# Patient Record
Sex: FEMALE | URBAN - METROPOLITAN AREA
[De-identification: names, ages, dates, MRNs, and addresses within clinical notes are randomized per-mention and may not be internally consistent; named-entity substitution may affect disease eponyms.]

---

## 2017-08-27 ENCOUNTER — HOSPITAL ENCOUNTER (OUTPATIENT)
Facility: MEDICAL CENTER | Age: 72
DRG: 247 | End: 2017-08-27
Payer: MEDICARE

## 2017-08-27 ENCOUNTER — HOSPITAL ENCOUNTER (INPATIENT)
Facility: MEDICAL CENTER | Age: 72
LOS: 2 days | DRG: 247 | End: 2017-08-29
Attending: HOSPITALIST | Admitting: HOSPITALIST
Payer: MEDICARE

## 2017-08-27 PROCEDURE — 770020 HCHG ROOM/CARE - TELE (206)

## 2017-08-27 RX ORDER — SODIUM CHLORIDE 9 MG/ML
INJECTION, SOLUTION INTRAVENOUS CONTINUOUS
Status: DISCONTINUED | OUTPATIENT
Start: 2017-08-28 | End: 2017-08-29 | Stop reason: HOSPADM

## 2017-08-27 RX ORDER — AMOXICILLIN 250 MG
2 CAPSULE ORAL 2 TIMES DAILY
Status: DISCONTINUED | OUTPATIENT
Start: 2017-08-28 | End: 2017-08-29 | Stop reason: HOSPADM

## 2017-08-27 RX ORDER — NAPROXEN 500 MG/1
250 TABLET ORAL 2 TIMES DAILY WITH MEALS
Status: DISCONTINUED | OUTPATIENT
Start: 2017-08-28 | End: 2017-08-28

## 2017-08-27 RX ORDER — AZELASTINE 1 MG/ML
1 SPRAY, METERED NASAL 2 TIMES DAILY
Status: DISCONTINUED | OUTPATIENT
Start: 2017-08-28 | End: 2017-08-28

## 2017-08-27 RX ORDER — ESCITALOPRAM OXALATE 10 MG/1
10 TABLET ORAL DAILY
COMMUNITY

## 2017-08-27 RX ORDER — ACETAMINOPHEN 325 MG/1
650 TABLET ORAL EVERY 6 HOURS PRN
Status: DISCONTINUED | OUTPATIENT
Start: 2017-08-27 | End: 2017-08-29 | Stop reason: HOSPADM

## 2017-08-27 RX ORDER — ESCITALOPRAM OXALATE 10 MG/1
10 TABLET ORAL DAILY
Status: DISCONTINUED | OUTPATIENT
Start: 2017-08-28 | End: 2017-08-29 | Stop reason: HOSPADM

## 2017-08-27 RX ORDER — ONDANSETRON 2 MG/ML
4 INJECTION INTRAMUSCULAR; INTRAVENOUS EVERY 4 HOURS PRN
Status: DISCONTINUED | OUTPATIENT
Start: 2017-08-27 | End: 2017-08-29 | Stop reason: HOSPADM

## 2017-08-27 RX ORDER — NAPROXEN 250 MG/1
250 TABLET ORAL 2 TIMES DAILY WITH MEALS
Status: ON HOLD | COMMUNITY
End: 2017-08-29

## 2017-08-27 RX ORDER — FLUTICASONE PROPIONATE 110 UG/1
2 AEROSOL, METERED RESPIRATORY (INHALATION)
Status: DISCONTINUED | OUTPATIENT
Start: 2017-08-28 | End: 2017-08-28

## 2017-08-27 RX ORDER — AZELASTINE 1 MG/ML
1 SPRAY, METERED NASAL 2 TIMES DAILY
COMMUNITY

## 2017-08-27 RX ORDER — THYROID 30 MG/1
30 TABLET ORAL DAILY
COMMUNITY

## 2017-08-27 RX ORDER — POLYETHYLENE GLYCOL 3350 17 G/17G
1 POWDER, FOR SOLUTION ORAL
Status: DISCONTINUED | OUTPATIENT
Start: 2017-08-27 | End: 2017-08-29 | Stop reason: HOSPADM

## 2017-08-27 RX ORDER — DICLOFENAC SODIUM 75 MG/1
75 TABLET, DELAYED RELEASE ORAL 2 TIMES DAILY
COMMUNITY

## 2017-08-27 RX ORDER — THYROID 30 MG/1
30 TABLET ORAL
Status: DISCONTINUED | OUTPATIENT
Start: 2017-08-28 | End: 2017-08-29 | Stop reason: HOSPADM

## 2017-08-27 RX ORDER — BISACODYL 10 MG
10 SUPPOSITORY, RECTAL RECTAL
Status: DISCONTINUED | OUTPATIENT
Start: 2017-08-27 | End: 2017-08-29 | Stop reason: HOSPADM

## 2017-08-27 RX ORDER — FLUTICASONE PROPIONATE 110 UG/1
2 AEROSOL, METERED RESPIRATORY (INHALATION) 2 TIMES DAILY
COMMUNITY

## 2017-08-27 RX ORDER — DICLOFENAC SODIUM 75 MG/1
75 TABLET, DELAYED RELEASE ORAL 2 TIMES DAILY PRN
Status: DISCONTINUED | OUTPATIENT
Start: 2017-08-27 | End: 2017-08-29 | Stop reason: HOSPADM

## 2017-08-27 RX ORDER — ONDANSETRON 4 MG/1
4 TABLET, ORALLY DISINTEGRATING ORAL EVERY 4 HOURS PRN
Status: DISCONTINUED | OUTPATIENT
Start: 2017-08-27 | End: 2017-08-29 | Stop reason: HOSPADM

## 2017-08-27 ASSESSMENT — COGNITIVE AND FUNCTIONAL STATUS - GENERAL
MOBILITY SCORE: 24
DAILY ACTIVITIY SCORE: 24
SUGGESTED CMS G CODE MODIFIER DAILY ACTIVITY: CH
SUGGESTED CMS G CODE MODIFIER MOBILITY: CH

## 2017-08-27 ASSESSMENT — PATIENT HEALTH QUESTIONNAIRE - PHQ9
SUM OF ALL RESPONSES TO PHQ QUESTIONS 1-9: 0
1. LITTLE INTEREST OR PLEASURE IN DOING THINGS: NOT AT ALL
2. FEELING DOWN, DEPRESSED, IRRITABLE, OR HOPELESS: NOT AT ALL
SUM OF ALL RESPONSES TO PHQ9 QUESTIONS 1 AND 2: 0

## 2017-08-27 ASSESSMENT — PAIN SCALES - GENERAL
PAINLEVEL_OUTOF10: 0
PAINLEVEL_OUTOF10: 0

## 2017-08-27 ASSESSMENT — LIFESTYLE VARIABLES: EVER_SMOKED: YES

## 2017-08-27 NOTE — PROGRESS NOTES
Direct admit from [facility]. Accepted by Dr. LUIS ANTONIO lloyd for unstable angina.  ADT signed & held, needs to be released upon pt arrival.  No written orders received.

## 2017-08-28 ENCOUNTER — RESOLUTE PROFESSIONAL BILLING HOSPITAL PROF FEE (OUTPATIENT)
Dept: HOSPITALIST | Facility: MEDICAL CENTER | Age: 72
End: 2017-08-28
Payer: MEDICARE

## 2017-08-28 PROBLEM — J45.909 ASTHMA: Status: ACTIVE | Noted: 2017-08-28

## 2017-08-28 PROBLEM — I21.4 NSTEMI (NON-ST ELEVATED MYOCARDIAL INFARCTION) (HCC): Status: ACTIVE | Noted: 2017-08-28

## 2017-08-28 LAB
ACT BLD: 191 SEC (ref 74–137)
ALBUMIN SERPL BCP-MCNC: 4 G/DL (ref 3.2–4.9)
ALBUMIN/GLOB SERPL: 1.3 G/DL
ALP SERPL-CCNC: 70 U/L (ref 30–99)
ALT SERPL-CCNC: 14 U/L (ref 2–50)
ANION GAP SERPL CALC-SCNC: 9 MMOL/L (ref 0–11.9)
AST SERPL-CCNC: 19 U/L (ref 12–45)
BASOPHILS # BLD AUTO: 0.5 % (ref 0–1.8)
BASOPHILS # BLD: 0.02 K/UL (ref 0–0.12)
BILIRUB SERPL-MCNC: 0.6 MG/DL (ref 0.1–1.5)
BUN SERPL-MCNC: 18 MG/DL (ref 8–22)
CALCIUM SERPL-MCNC: 9.6 MG/DL (ref 8.5–10.5)
CHLORIDE SERPL-SCNC: 106 MMOL/L (ref 96–112)
CO2 SERPL-SCNC: 23 MMOL/L (ref 20–33)
CREAT SERPL-MCNC: 0.53 MG/DL (ref 0.5–1.4)
EKG IMPRESSION: NORMAL
EKG IMPRESSION: NORMAL
EOSINOPHIL # BLD AUTO: 0.06 K/UL (ref 0–0.51)
EOSINOPHIL NFR BLD: 1.6 % (ref 0–6.9)
ERYTHROCYTE [DISTWIDTH] IN BLOOD BY AUTOMATED COUNT: 43 FL (ref 35.9–50)
GFR SERPL CREATININE-BSD FRML MDRD: >60 ML/MIN/1.73 M 2
GLOBULIN SER CALC-MCNC: 3.1 G/DL (ref 1.9–3.5)
GLUCOSE SERPL-MCNC: 93 MG/DL (ref 65–99)
HCT VFR BLD AUTO: 41.5 % (ref 37–47)
HGB BLD-MCNC: 13.9 G/DL (ref 12–16)
IMM GRANULOCYTES # BLD AUTO: 0.01 K/UL (ref 0–0.11)
IMM GRANULOCYTES NFR BLD AUTO: 0.3 % (ref 0–0.9)
INR PPP: 0.97 (ref 0.87–1.13)
LV EJECT FRACT  99904: 55
LV EJECT FRACT MOD 2C 99903: 56.48
LV EJECT FRACT MOD 4C 99902: 51.99
LV EJECT FRACT MOD BP 99901: 54.27
LYMPHOCYTES # BLD AUTO: 1.3 K/UL (ref 1–4.8)
LYMPHOCYTES NFR BLD: 34.8 % (ref 22–41)
MCH RBC QN AUTO: 30.3 PG (ref 27–33)
MCHC RBC AUTO-ENTMCNC: 33.5 G/DL (ref 33.6–35)
MCV RBC AUTO: 90.4 FL (ref 81.4–97.8)
MONOCYTES # BLD AUTO: 0.53 K/UL (ref 0–0.85)
MONOCYTES NFR BLD AUTO: 14.2 % (ref 0–13.4)
NEUTROPHILS # BLD AUTO: 1.82 K/UL (ref 2–7.15)
NEUTROPHILS NFR BLD: 48.6 % (ref 44–72)
NRBC # BLD AUTO: 0 K/UL
NRBC BLD AUTO-RTO: 0 /100 WBC
PLATELET # BLD AUTO: 256 K/UL (ref 164–446)
PMV BLD AUTO: 9 FL (ref 9–12.9)
POTASSIUM SERPL-SCNC: 3.7 MMOL/L (ref 3.6–5.5)
PROT SERPL-MCNC: 7.1 G/DL (ref 6–8.2)
PROTHROMBIN TIME: 13.2 SEC (ref 12–14.6)
RBC # BLD AUTO: 4.59 M/UL (ref 4.2–5.4)
SODIUM SERPL-SCNC: 138 MMOL/L (ref 135–145)
TROPONIN I SERPL-MCNC: 0.21 NG/ML (ref 0–0.04)
WBC # BLD AUTO: 3.7 K/UL (ref 4.8–10.8)

## 2017-08-28 PROCEDURE — A9270 NON-COVERED ITEM OR SERVICE: HCPCS

## 2017-08-28 PROCEDURE — 700111 HCHG RX REV CODE 636 W/ 250 OVERRIDE (IP)

## 2017-08-28 PROCEDURE — C1725 CATH, TRANSLUMIN NON-LASER: HCPCS

## 2017-08-28 PROCEDURE — C9600 PERC DRUG-EL COR STENT SING: HCPCS | Mod: LD

## 2017-08-28 PROCEDURE — C1769 GUIDE WIRE: HCPCS

## 2017-08-28 PROCEDURE — 4A023N7 MEASUREMENT OF CARDIAC SAMPLING AND PRESSURE, LEFT HEART, PERCUTANEOUS APPROACH: ICD-10-PCS | Performed by: INTERNAL MEDICINE

## 2017-08-28 PROCEDURE — 85347 COAGULATION TIME ACTIVATED: CPT

## 2017-08-28 PROCEDURE — C1887 CATHETER, GUIDING: HCPCS

## 2017-08-28 PROCEDURE — 94760 N-INVAS EAR/PLS OXIMETRY 1: CPT

## 2017-08-28 PROCEDURE — 700117 HCHG RX CONTRAST REV CODE 255: Performed by: INTERNAL MEDICINE

## 2017-08-28 PROCEDURE — 85610 PROTHROMBIN TIME: CPT

## 2017-08-28 PROCEDURE — 93306 TTE W/DOPPLER COMPLETE: CPT

## 2017-08-28 PROCEDURE — 84484 ASSAY OF TROPONIN QUANT: CPT

## 2017-08-28 PROCEDURE — 93458 L HRT ARTERY/VENTRICLE ANGIO: CPT

## 2017-08-28 PROCEDURE — A9270 NON-COVERED ITEM OR SERVICE: HCPCS | Performed by: INTERNAL MEDICINE

## 2017-08-28 PROCEDURE — 93005 ELECTROCARDIOGRAM TRACING: CPT | Performed by: INTERNAL MEDICINE

## 2017-08-28 PROCEDURE — 700102 HCHG RX REV CODE 250 W/ 637 OVERRIDE(OP): Performed by: INTERNAL MEDICINE

## 2017-08-28 PROCEDURE — C1874 STENT, COATED/COV W/DEL SYS: HCPCS

## 2017-08-28 PROCEDURE — 700102 HCHG RX REV CODE 250 W/ 637 OVERRIDE(OP)

## 2017-08-28 PROCEDURE — A9270 NON-COVERED ITEM OR SERVICE: HCPCS | Performed by: HOSPITALIST

## 2017-08-28 PROCEDURE — 99153 MOD SED SAME PHYS/QHP EA: CPT

## 2017-08-28 PROCEDURE — 36415 COLL VENOUS BLD VENIPUNCTURE: CPT

## 2017-08-28 PROCEDURE — B2151ZZ FLUOROSCOPY OF LEFT HEART USING LOW OSMOLAR CONTRAST: ICD-10-PCS | Performed by: INTERNAL MEDICINE

## 2017-08-28 PROCEDURE — C9606 PERC D-E COR REVASC W AMI S: HCPCS | Mod: LD

## 2017-08-28 PROCEDURE — B2111ZZ FLUOROSCOPY OF MULTIPLE CORONARY ARTERIES USING LOW OSMOLAR CONTRAST: ICD-10-PCS | Performed by: INTERNAL MEDICINE

## 2017-08-28 PROCEDURE — 700101 HCHG RX REV CODE 250

## 2017-08-28 PROCEDURE — 85025 COMPLETE CBC W/AUTO DIFF WBC: CPT

## 2017-08-28 PROCEDURE — 360979 HCHG DIAGNOSTIC CATH

## 2017-08-28 PROCEDURE — 700102 HCHG RX REV CODE 250 W/ 637 OVERRIDE(OP): Performed by: HOSPITALIST

## 2017-08-28 PROCEDURE — 80053 COMPREHEN METABOLIC PANEL: CPT

## 2017-08-28 PROCEDURE — C1894 INTRO/SHEATH, NON-LASER: HCPCS

## 2017-08-28 PROCEDURE — 027034Z DILATION OF CORONARY ARTERY, ONE ARTERY WITH DRUG-ELUTING INTRALUMINAL DEVICE, PERCUTANEOUS APPROACH: ICD-10-PCS | Performed by: INTERNAL MEDICINE

## 2017-08-28 PROCEDURE — 770020 HCHG ROOM/CARE - TELE (206)

## 2017-08-28 PROCEDURE — 700105 HCHG RX REV CODE 258: Performed by: HOSPITALIST

## 2017-08-28 PROCEDURE — 93010 ELECTROCARDIOGRAM REPORT: CPT | Mod: 76 | Performed by: INTERNAL MEDICINE

## 2017-08-28 PROCEDURE — 99152 MOD SED SAME PHYS/QHP 5/>YRS: CPT

## 2017-08-28 PROCEDURE — 304952 HCHG R 2 PADS

## 2017-08-28 PROCEDURE — 307093 HCHG TR BAND RADIAL

## 2017-08-28 PROCEDURE — 99222 1ST HOSP IP/OBS MODERATE 55: CPT | Mod: AI | Performed by: HOSPITALIST

## 2017-08-28 RX ORDER — SCOLOPAMINE TRANSDERMAL SYSTEM 1 MG/1
1 PATCH, EXTENDED RELEASE TRANSDERMAL
Status: DISCONTINUED | OUTPATIENT
Start: 2017-08-28 | End: 2017-08-29 | Stop reason: HOSPADM

## 2017-08-28 RX ORDER — DIPHENHYDRAMINE HYDROCHLORIDE 50 MG/ML
25 INJECTION INTRAMUSCULAR; INTRAVENOUS EVERY 6 HOURS PRN
Status: DISCONTINUED | OUTPATIENT
Start: 2017-08-28 | End: 2017-08-29 | Stop reason: HOSPADM

## 2017-08-28 RX ORDER — PRASUGREL 10 MG/1
60 TABLET, FILM COATED ORAL ONCE
Status: DISCONTINUED | OUTPATIENT
Start: 2017-08-28 | End: 2017-08-28

## 2017-08-28 RX ORDER — LORAZEPAM 1 MG/1
0.5 TABLET ORAL EVERY 4 HOURS PRN
Status: DISCONTINUED | OUTPATIENT
Start: 2017-08-28 | End: 2017-08-29 | Stop reason: HOSPADM

## 2017-08-28 RX ORDER — PRASUGREL 10 MG/1
10 TABLET, FILM COATED ORAL DAILY
Status: DISCONTINUED | OUTPATIENT
Start: 2017-08-29 | End: 2017-08-29 | Stop reason: HOSPADM

## 2017-08-28 RX ORDER — ALBUTEROL SULFATE 90 UG/1
2 AEROSOL, METERED RESPIRATORY (INHALATION)
Status: DISCONTINUED | OUTPATIENT
Start: 2017-08-28 | End: 2017-08-29 | Stop reason: HOSPADM

## 2017-08-28 RX ORDER — ONDANSETRON 2 MG/ML
4 INJECTION INTRAMUSCULAR; INTRAVENOUS EVERY 4 HOURS PRN
Status: DISCONTINUED | OUTPATIENT
Start: 2017-08-28 | End: 2017-08-29 | Stop reason: HOSPADM

## 2017-08-28 RX ORDER — ASPIRIN 325 MG
325 TABLET ORAL DAILY
Status: DISCONTINUED | OUTPATIENT
Start: 2017-08-28 | End: 2017-08-28

## 2017-08-28 RX ORDER — ZOLPIDEM TARTRATE 5 MG/1
5 TABLET ORAL
Status: DISCONTINUED | OUTPATIENT
Start: 2017-08-28 | End: 2017-08-29 | Stop reason: HOSPADM

## 2017-08-28 RX ORDER — MIDAZOLAM HYDROCHLORIDE 1 MG/ML
INJECTION INTRAMUSCULAR; INTRAVENOUS
Status: COMPLETED
Start: 2017-08-28 | End: 2017-08-28

## 2017-08-28 RX ORDER — ZOLPIDEM TARTRATE 5 MG/1
5 TABLET ORAL NIGHTLY PRN
Status: DISCONTINUED | OUTPATIENT
Start: 2017-08-28 | End: 2017-08-28

## 2017-08-28 RX ORDER — ROSUVASTATIN CALCIUM 20 MG/1
20 TABLET, COATED ORAL EVERY EVENING
Status: DISCONTINUED | OUTPATIENT
Start: 2017-08-28 | End: 2017-08-29 | Stop reason: HOSPADM

## 2017-08-28 RX ORDER — HALOPERIDOL 5 MG/ML
1 INJECTION INTRAMUSCULAR EVERY 6 HOURS PRN
Status: DISCONTINUED | OUTPATIENT
Start: 2017-08-28 | End: 2017-08-29 | Stop reason: HOSPADM

## 2017-08-28 RX ORDER — ATORVASTATIN CALCIUM 80 MG/1
80 TABLET, FILM COATED ORAL
Status: DISCONTINUED | OUTPATIENT
Start: 2017-08-28 | End: 2017-08-28

## 2017-08-28 RX ORDER — ONDANSETRON 2 MG/ML
INJECTION INTRAMUSCULAR; INTRAVENOUS
Status: COMPLETED
Start: 2017-08-28 | End: 2017-08-28

## 2017-08-28 RX ORDER — LIDOCAINE HYDROCHLORIDE 20 MG/ML
INJECTION, SOLUTION INFILTRATION; PERINEURAL
Status: COMPLETED
Start: 2017-08-28 | End: 2017-08-28

## 2017-08-28 RX ORDER — HEPARIN SODIUM,PORCINE 1000/ML
VIAL (ML) INJECTION
Status: COMPLETED
Start: 2017-08-28 | End: 2017-08-28

## 2017-08-28 RX ORDER — VERAPAMIL HYDROCHLORIDE 2.5 MG/ML
INJECTION, SOLUTION INTRAVENOUS
Status: COMPLETED
Start: 2017-08-28 | End: 2017-08-28

## 2017-08-28 RX ORDER — DEXAMETHASONE SODIUM PHOSPHATE 4 MG/ML
4 INJECTION, SOLUTION INTRA-ARTICULAR; INTRALESIONAL; INTRAMUSCULAR; INTRAVENOUS; SOFT TISSUE
Status: DISCONTINUED | OUTPATIENT
Start: 2017-08-28 | End: 2017-08-29 | Stop reason: HOSPADM

## 2017-08-28 RX ORDER — FLUTICASONE PROPIONATE 110 UG/1
2 AEROSOL, METERED RESPIRATORY (INHALATION) EVERY 12 HOURS
Status: DISCONTINUED | OUTPATIENT
Start: 2017-08-28 | End: 2017-08-29 | Stop reason: HOSPADM

## 2017-08-28 RX ORDER — ASPIRIN 81 MG/1
TABLET, CHEWABLE ORAL
Status: COMPLETED
Start: 2017-08-28 | End: 2017-08-28

## 2017-08-28 RX ORDER — PRASUGREL 10 MG/1
TABLET, FILM COATED ORAL
Status: COMPLETED
Start: 2017-08-28 | End: 2017-08-28

## 2017-08-28 RX ADMIN — ZOLPIDEM TARTRATE 5 MG: 5 TABLET, FILM COATED ORAL at 21:19

## 2017-08-28 RX ADMIN — FLUTICASONE PROPIONATE 220 MCG: 110 AEROSOL, METERED RESPIRATORY (INHALATION) at 21:27

## 2017-08-28 RX ADMIN — Medication 60 MG: at 08:49

## 2017-08-28 RX ADMIN — SODIUM CHLORIDE: 9 INJECTION, SOLUTION INTRAVENOUS at 01:19

## 2017-08-28 RX ADMIN — IOHEXOL 220 ML: 350 INJECTION, SOLUTION INTRAVENOUS at 09:19

## 2017-08-28 RX ADMIN — HEPARIN SODIUM 2000 UNITS: 200 INJECTION, SOLUTION INTRAVENOUS at 08:28

## 2017-08-28 RX ADMIN — LIDOCAINE HYDROCHLORIDE: 20 INJECTION, SOLUTION INFILTRATION; PERINEURAL at 08:27

## 2017-08-28 RX ADMIN — FENTANYL CITRATE 75 MCG: 50 INJECTION, SOLUTION INTRAMUSCULAR; INTRAVENOUS at 08:27

## 2017-08-28 RX ADMIN — HEPARIN SODIUM: 1000 INJECTION, SOLUTION INTRAVENOUS; SUBCUTANEOUS at 08:27

## 2017-08-28 RX ADMIN — MIDAZOLAM 2 MG: 1 INJECTION INTRAMUSCULAR; INTRAVENOUS at 08:26

## 2017-08-28 RX ADMIN — FLUTICASONE PROPIONATE 220 MCG: 110 AEROSOL, METERED RESPIRATORY (INHALATION) at 07:21

## 2017-08-28 RX ADMIN — ZOLPIDEM TARTRATE 5 MG: 5 TABLET, FILM COATED ORAL at 01:20

## 2017-08-28 RX ADMIN — ROSUVASTATIN CALCIUM 20 MG: 20 TABLET, FILM COATED ORAL at 21:18

## 2017-08-28 RX ADMIN — ASPIRIN 324 MG: 81 TABLET, CHEWABLE ORAL at 09:12

## 2017-08-28 RX ADMIN — ZOLPIDEM TARTRATE 5 MG: 5 TABLET, FILM COATED ORAL at 02:54

## 2017-08-28 RX ADMIN — METOPROLOL TARTRATE 25 MG: 25 TABLET, FILM COATED ORAL at 21:19

## 2017-08-28 RX ADMIN — METOPROLOL TARTRATE 25 MG: 25 TABLET, FILM COATED ORAL at 11:51

## 2017-08-28 RX ADMIN — LORAZEPAM 0.5 MG: 1 TABLET ORAL at 01:28

## 2017-08-28 RX ADMIN — VERAPAMIL HYDROCHLORIDE 2.5 MG: 2.5 INJECTION, SOLUTION INTRAVENOUS at 08:27

## 2017-08-28 RX ADMIN — ONDANSETRON 4 MG: 2 INJECTION INTRAMUSCULAR; INTRAVENOUS at 09:13

## 2017-08-28 RX ADMIN — LORAZEPAM 0.5 MG: 1 TABLET ORAL at 09:56

## 2017-08-28 RX ADMIN — NITROGLYCERIN 10 ML: 20 INJECTION INTRAVENOUS at 09:13

## 2017-08-28 RX ADMIN — MIDAZOLAM 0.5 MG: 1 INJECTION INTRAMUSCULAR; INTRAVENOUS at 09:13

## 2017-08-28 ASSESSMENT — ENCOUNTER SYMPTOMS
BLURRED VISION: 0
DIZZINESS: 0
WEIGHT LOSS: 0
DOUBLE VISION: 0
INSOMNIA: 0
COUGH: 0
MYALGIAS: 0
SEIZURES: 0
STRIDOR: 0
SHORTNESS OF BREATH: 0
ORTHOPNEA: 0
NERVOUS/ANXIOUS: 0
EYE DISCHARGE: 0
NAUSEA: 0
CHILLS: 0
BACK PAIN: 0
DEPRESSION: 0
PALPITATIONS: 0
EYE REDNESS: 0
EYE PAIN: 0
FOCAL WEAKNESS: 0
BRUISES/BLEEDS EASILY: 0
SPUTUM PRODUCTION: 0
VOMITING: 0
ABDOMINAL PAIN: 0
NECK PAIN: 0
TREMORS: 0
HEARTBURN: 0
HEADACHES: 0
FEVER: 0
DIARRHEA: 0

## 2017-08-28 ASSESSMENT — COPD QUESTIONNAIRES
DURING THE PAST 4 WEEKS HOW MUCH DID YOU FEEL SHORT OF BREATH: NONE/LITTLE OF THE TIME
DO YOU EVER COUGH UP ANY MUCUS OR PHLEGM?: NO/ONLY WITH OCCASIONAL COLDS OR INFECTIONS
HAVE YOU SMOKED AT LEAST 100 CIGARETTES IN YOUR ENTIRE LIFE: NO/DON'T KNOW
COPD SCREENING SCORE: 2

## 2017-08-28 ASSESSMENT — PAIN SCALES - GENERAL
PAINLEVEL_OUTOF10: 0

## 2017-08-28 ASSESSMENT — LIFESTYLE VARIABLES: EVER_SMOKED: YES

## 2017-08-28 NOTE — CATH LAB
Immediate Post-Operative Note      PreOp Diagnosis: non STEMI    PostOp Diagnosis: same    Procedure(s) :  Coronary Angiography, Left Heart Catheterization, Left Ventriculography.  2.75 x 12 Xiecne stent at ostium of ALEXIS. R radial approach    Surgeon(s):  Marcus Sawyer M.D.    Type of Anesthesia: Moderate Sedation    Specimen: None    Estimated Blood Loss: 10 cc's    Contrast Media:  220 cc's    Fluoro Time: 114.8 min    Xray DAP: [id number]    Findings: subtotal (98%) osteal ALEXIS with TAHIR II flow. Stented with good result. Mild hypokinsis of distal anterior wall.    Complications: none      Marcus Sawyer M.D.  8/28/2017 9:09 AM

## 2017-08-28 NOTE — PROGRESS NOTES
Air removed per protocol, and TR band removed. No signs of bleeding or hematoma. Right hand is warm and pulse strong, cap refill WDL. Pt denies numbness and tingling. Gauze and tegaderm applied to site. Pt reeducated on restrictions. Will monitor.

## 2017-08-28 NOTE — PROGRESS NOTES
Assumed care of patient at this time. Bedside report received from night RN. Pt and family inquiring about cath procedure today, pt told last night cath would be early this am. Cath lab called, no procedure ordered. Dr. Davis notified.

## 2017-08-28 NOTE — PROGRESS NOTES
Pt and family requesting to speak to cardiologist about discharge plans and home restrictions. Informed they will see cardiologist tomorrow during rounds and all questions can be addressed then. Family insisting they need to speak with them tonight.     Dr. Davis made aware of family and patient insisting to speak with him.

## 2017-08-28 NOTE — PROGRESS NOTES
Pt returned to floor from cath lab. Pt Ax4 and complaining of generalized discomfort and nausea. Pt given PRN ativan. Pt educated on restrictions for right wrist and POC. Pt v/u and will be continually reeducated. TR band to right wrist, no signs of bleeding or hematoma. Right hand is warm, cap refill WDL, pulse ox on with good wave form. Will continue to monitor closely.

## 2017-08-28 NOTE — PROGRESS NOTES
[facility] Hospitalist Progress Note    Date of Service: 2017    Chief Complaint  71 y.o. female admitted 2017 with ***    Interval Problem Update  ***    Consultants/Specialty  ***    Disposition  ***        ROS   Physical Exam  Laboratory/Imaging   Hemodynamics  Temp (24hrs), Av.7 °C (98 °F), Min:36.3 °C (97.3 °F), Max:37 °C (98.6 °F)   Temperature: 36.3 °C (97.3 °F)  Pulse  Av.5  Min: 90  Max: 107    Blood Pressure : 141/87      Respiratory      Respiration: 16, Pulse Oximetry: 93 %, O2 Daily Delivery Respiratory : Room Air with O2 Available     #MDI/DPI Given: MDI/DPI x 1, Work Of Breathing / Effort: Mild  RUL Breath Sounds: Clear, RML Breath Sounds: Clear, RLL Breath Sounds: Diminished, OCTAVIO Breath Sounds: Clear, LLL Breath Sounds: Diminished    Fluids    Intake/Output Summary (Last 24 hours) at 17 0801  Last data filed at 17 0600   Gross per 24 hour   Intake              490 ml   Output                0 ml   Net              490 ml       Nutrition  Orders Placed This Encounter   Procedures   • Diet NPO     Standing Status:   Standing     Number of Occurrences:   1     Order Specific Question:   Restrict to:     Answer:   Strict [1]     Physical Exam    Recent Labs      17   0121   WBC  3.7*   RBC  4.59   HEMOGLOBIN  13.9   HEMATOCRIT  41.5   MCV  90.4   MCH  30.3   MCHC  33.5*   RDW  43.0   PLATELETCT  256   MPV  9.0     Recent Labs      17   0121   SODIUM  138   POTASSIUM  3.7   CHLORIDE  106   CO2  23   GLUCOSE  93   BUN  18   CREATININE  0.53   CALCIUM  9.6     Recent Labs      17   0121   INR  0.97                  Assessment/Plan     Asthma- (present on admission)   Assessment & Plan    Well control, prn inhalers         NSTEMI (non-ST elevated myocardial infarction) (CMS-HCC)- (present on admission)   Assessment & Plan    Has mildly elevated troponin but moderate defect on NM stress test ,cardiologist consulted, going for cath in am. She is pain free at this  time.           Quality-Core Measures

## 2017-08-28 NOTE — PROGRESS NOTES
[facility] Hospitalist Progress Note    Date of Service: 2017    Chief Complaint  71 y.o. female with PMH of asthma admitted 2017 with NSTEMI    Interval Problem Update  Had cardiac cath and stent placed today. Feeling ok. Denies chest pain.    Consultants/Specialty  card    Disposition  Remain on the floor.        Review of Systems   Constitutional: Negative for chills, fever and weight loss.   HENT: Negative for congestion and nosebleeds.    Eyes: Negative for blurred vision, pain, discharge and redness.   Respiratory: Negative for cough, sputum production, shortness of breath and stridor.    Cardiovascular: Positive for chest pain. Negative for palpitations and orthopnea.   Gastrointestinal: Negative for abdominal pain, diarrhea, heartburn, nausea and vomiting.   Genitourinary: Negative for dysuria, frequency and urgency.   Musculoskeletal: Negative for back pain, myalgias and neck pain.   Skin: Negative for itching and rash.   Neurological: Negative for dizziness, focal weakness, seizures and headaches.   Psychiatric/Behavioral: Negative for depression. The patient is not nervous/anxious and does not have insomnia.       Physical Exam  Laboratory/Imaging   Hemodynamics  Temp (24hrs), Av.4 °C (97.5 °F), Min:36 °C (96.8 °F), Max:37 °C (98.6 °F)   Temperature: 36.1 °C (97 °F)  Pulse  Av.3  Min: 84  Max: 107    Blood Pressure : 149/95      Respiratory      Respiration: 18, Pulse Oximetry: 94 %, O2 Daily Delivery Respiratory : Room Air with O2 Available     #MDI/DPI Given: MDI/DPI x 1, Work Of Breathing / Effort: Mild  RUL Breath Sounds: Clear, RML Breath Sounds: Clear, RLL Breath Sounds: Clear, OCTAVIO Breath Sounds: Clear, LLL Breath Sounds: Clear    Fluids    Intake/Output Summary (Last 24 hours) at 17 1240  Last data filed at 17 0900   Gross per 24 hour   Intake              490 ml   Output                0 ml   Net              490 ml       Nutrition  Orders Placed This Encounter    Procedures   • Diet Order     Standing Status:   Standing     Number of Occurrences:   1     Order Specific Question:   Diet:     Answer:   Cardiac [6]     Physical Exam   Constitutional: She is oriented to person, place, and time. No distress.   HENT:   Head: Normocephalic and atraumatic.   Mouth/Throat: Oropharynx is clear and moist.   Eyes: Conjunctivae and EOM are normal. Pupils are equal, round, and reactive to light.   Neck: Normal range of motion. Neck supple. No tracheal deviation present. No thyromegaly present.   Cardiovascular: Normal rate and regular rhythm.    No murmur heard.  Pulmonary/Chest: Effort normal and breath sounds normal. No respiratory distress. She has no wheezes.   Abdominal: Soft. Bowel sounds are normal. She exhibits no distension. There is no tenderness.   Musculoskeletal: She exhibits no edema or tenderness.   Neurological: She is alert and oriented to person, place, and time. No cranial nerve deficit.   Skin: Skin is warm and dry. She is not diaphoretic. No erythema.   Psychiatric: She has a normal mood and affect. Her behavior is normal. Thought content normal.       Recent Labs      08/28/17   0121   WBC  3.7*   RBC  4.59   HEMOGLOBIN  13.9   HEMATOCRIT  41.5   MCV  90.4   MCH  30.3   MCHC  33.5*   RDW  43.0   PLATELETCT  256   MPV  9.0     Recent Labs      08/28/17   0121   SODIUM  138   POTASSIUM  3.7   CHLORIDE  106   CO2  23   GLUCOSE  93   BUN  18   CREATININE  0.53   CALCIUM  9.6     Recent Labs      08/28/17   0121   INR  0.97                  Assessment/Plan     NSTEMI (non-ST elevated myocardial infarction) (CMS-[facility])- (present on admission)   Assessment & Plan    Cardiac cath today: showed subtotal (98%) osteal ALEXIS with TAHIR II flow post Stent with good result. Mild hypokinsis of distal anterior wall.  On asa, effient, statin, bbocker  Card: following        Asthma- (present on admission)   Assessment & Plan    Well control, prn inhalers             Reviewed items::   Labs reviewed, Medications reviewed and Radiology images reviewed  DVT: on asa and effient.

## 2017-08-28 NOTE — CONSULTS
Cardiology Consult Note    DOS: 8/28/2017    Consulting physician: Rashad Yadav    Chief complaint/Reason for consult: NSTEMI    HPI:  Patient is a 70 yo WF with no medical history, from the [place] area, who was in her usual state of health until Friday. She said she was short of breath and very fatigued. She saw her MD there who thought it might have been an asthma exacerbation. She drove back to [facility] where she has a second home and symptoms persisted. She did have some chest discomfort transiently when walking up the stairs. Centrally located. This prompted her to head to nearby ED. There her initial troponin was negative but became very mildly positive at 0.06. She underwent nuclear stress showing a moderate anterior defect with moderate amount of adonay-infarct ischemia. She has transferred here for further care. Currently very comfortable.    ROS (+ highlighted in red):  Constitutional: Fevers/chills/fatigue/weightloss  HEENT: Blurry vision/eye pain/sore throat/hearing loss  Respiratory: Shortness of breath/cough  Cardiovascular: Chest pain/palpitations/edema/orthopnea/syncope  GI: Nausea/vomitting/diarrhea  MSK: Arthralgias/myagias/muscle weakness  Skin: Rash/sores  Neurological: Numbness/tremors/vertigo  Endocrine: Excessive thirst/polyuria/cold intolerance/heat intolerance  Psych: Depression/anxiety    Past Medical History:   Diagnosis Date   • Asthma        Past Surgical History:   Procedure Laterality Date   • KNEE REPLACEMENT, TOTAL         Social History     Social History   • Marital status: N/A     Spouse name: N/A   • Number of children: N/A   • Years of education: N/A     Occupational History   • Not on file.     Social History Main Topics   • Smoking status: Former Smoker   • Smokeless tobacco: Never Used   • Alcohol use 1.2 oz/week     2 Glasses of wine per week      Comment: 2 glasses a night   • Drug use: Unknown   • Sexual activity: Not on file     Other Topics Concern   • Not on file     Social  History Narrative   • No narrative on file       No family history on file.    Allergies not on file    Current Facility-Administered Medications   Medication Dose Route Frequency Provider Last Rate Last Dose   • atorvastatin (LIPITOR) tablet 80 mg  80 mg Oral QHS Shining Sun, M.D.       • aspirin (ASA) tablet 325 mg  325 mg Oral DAILY Shining Sun, M.D.       • enoxaparin (LOVENOX) inj 80 mg  80 mg Subcutaneous AT 1800 TODAY Shining Sun, M.D.       • zolpidem (AMBIEN) tablet 5 mg  5 mg Oral HS PRN Shining Sun, M.D.       • lorazepam (ATIVAN) tablet 0.5 mg  0.5 mg Oral Q4HRS PRN Shining Sun, M.D.       • azelastine (ASTELIN) SOLN 137 mcg  1 Spray Nasal BID Suleman Martini M.D.       • escitalopram (LEXAPRO) tablet 10 mg  10 mg Oral DAILY Suleman Martini M.D.       • fluticasone (FLOVENT HFA) 110 MCG/ACT inhaler 220 mcg  2 Puff Inhalation BID Suleman Martini M.D.       • albuterol (PROVENTIL) 25 mg in NS 60 mL for continuous nebulization  5 mg/hr Nebulization TID PRN Suleman Martini M.D.       • thyroid (ARMOUR THYROID) tablet 30 mg  30 mg Oral DAILY Suleman Martini M.D.       • diclofenac EC (VOLTAREN) tablet 75 mg  75 mg Oral BID PRN Suleman Martini M.D.       • senna-docusate (PERICOLACE or SENOKOT S) 8.6-50 MG per tablet 2 Tab  2 Tab Oral BID Suleman Martini M.D.        And   • polyethylene glycol/lytes (MIRALAX) PACKET 1 Packet  1 Packet Oral QDAY PRN Suleman Martini M.D.        And   • magnesium hydroxide (MILK OF MAGNESIA) suspension 30 mL  30 mL Oral QDAY PRN Suleman Martini M.D.        And   • bisacodyl (DULCOLAX) suppository 10 mg  10 mg Rectal QDAY PRN Suleman Martini M.D.       • Respiratory Care per Protocol   Nebulization Continuous RT Suleman Martini M.D.       • NS infusion   Intravenous Continuous Suleman Martini M.D.       • ondansetron (ZOFRAN) syringe/vial injection 4 mg  4 mg Intravenous Q4HRS PRN Suleman Martini M.D.     "   • ondansetron (ZOFRAN ODT) dispertab 4 mg  4 mg Oral Q4HRS PRN Suleman Martini M.D.       • acetaminophen (TYLENOL) tablet 650 mg  650 mg Oral Q6HRS PRN Suleman Martini M.D.           Physical Exam:  Vitals:    08/27/17 1956   BP: 115/80   Pulse: (!) 107   Resp: 16   Temp: 36.7 °C (98 °F)   SpO2: 95%   Weight: 74.6 kg (164 lb 7.4 oz)   Height: 1.753 m (5' 9\")     General appearance: NAD, conversant   Eyes: anicteric sclerae, moist conjunctivae; no lid-lag; PERRLA  HENT: Atraumatic; oropharynx clear with moist mucous membranes and no mucosal ulcerations; normal hard and soft palate  Neck: Trachea midline; FROM, supple, no thyromegaly or lymphadenopathy  Lungs: CTA, with normal respiratory effort and no intercostal retractions  CV: RRR, no MRGs, no JVD   Abdomen: Soft, non-tender; no masses or HSM  Extremities: No peripheral edema or extremity lymphadenopathy  Skin: Normal temperature, turgor and texture; no rash, ulcers or subcutaneous nodules  Psych: Appropriate affect, alert and oriented to person, place and time    Data:  Labs reviewed:  Outside labs reviewed, showing 0.01 -> 0.06 trop    Nuclear study reviewed    EKG here is pending, outside EKG with ST depression    Impression/Plan:  1)NSTEMI  2)Positive MPI    -Has very mildly positive trop and EKG changes along with high risk stress findings  -Based on this, I discussed proceeding with cardiac catheterization to define coronary anatomy +/- interventions  -She is anxious but wants to proceed with the procedure  -Risks and benefits discussed and all her questions regarding the procedure were answered  -Will get labs including enzymes, lipids and echo  -Will give ASA, high potency statin, systemic anticoagulation for her NSTEMI tonight  -Please call with ?s    Amara Galeana MD    "

## 2017-08-28 NOTE — PROGRESS NOTES
Pt unable to sleep after medication. Orders to repeat ambien if needed obtained. Pt denies chest pain, SR on monitor

## 2017-08-28 NOTE — PROCEDURES
DATE OF SERVICE:  08/28/2017    PROCEDURES PERFORMED:  1.  Selective coronary angiography.  2.  Left heart catheterization.  3.  Left ventriculography.  4.  Placement of 2.75x12 Xience Alpine stent at the origin of the major LAD   diagonal.    INDICATIONS:  The patient is a 71-year-old female, transferred from [facility] in [place], [place] because of evidence of a non-STEMI   myocardial infarction and positive myocardial perfusion scan.  Lab at this   institution revealed a repeat troponin of 0.21, which was still in the   indeterminate range.  She was taken to the cath lab for coronary angiography   and possible intervention.    DESCRIPTION OF PROCEDURE:  Informed consent was obtained.  The procedure and   risks were explained to the patient prior to beginning the procedure.  The   right wrist was then sterilely prepped and draped in the usual fashion and the   patient was premedicated with initially Versed 1 mg and fentanyl 50 mcg.  She   received additional Versed and fentanyl during this procedure.  Please refer   to the nurses' notes for dosages and timing.    The area of the right radial artery was anesthetized with 2% lidocaine and the   artery was easily entered.  A 6-German sheath was placed.  She was then given   4000 units of intravenous heparin, 100 mcg of intra-arterial nitroglycerin   and 2.5 mg of intra-arterial verapamil.  A 5-German Nathaniel catheter was easily   advanced under fluoroscopic guidance to the ostium of the left main coronary   artery where selective angiograms were performed.  This catheter was then   advanced into the ostium of the right coronary artery where selective   angiograms were again performed.  Following this, a pigtail catheter was   exchanged and a left heart catheterization was performed followed by left   ventriculogram using 30 mL of contrast.  A left heart pullback was performed.    An ACT was checked and the patient was given additional  heparin.    Coronary intervention was performed.  An EBU guide was placed and a BMW wire   was placed into the distal LAD for protection purposes.  Next, attention was   turned to the very high-grade lesion at the origin of the LAD diagonal.    Initially, a  50 wire was used, but this would not cross the lesion   neither with a Cross-It wire.  Finally, the lesion was crossed with the   Whisper wire and then a 2.0x12 mm balloon was placed across the lesion and   multiple inflations were performed.  The lesion was then stented with a   2.75x12 mm Xience Alpine stent, which was postdilated with a noncompliant   balloon at 14 atmospheres proximally and distally.  The balloon was withdrawn   and a 3.0x12 mm noncompliant balloon was placed over the LAD wire and   positioned at the ostium of the diagonal artery and was gently inflated at 12   atmospheres to ensure that there was no protrusion of the stent into the LAD.    Final angiograms revealed an excellent result with TAHIR 3 flow to the   terminus of the vessel.  There was minimal residual stenosis in the stented   segment.  The LAD had a 20% stenosis at the origin of the LAD diagonal, and   this is unchanged from the precatheterization images.  The guiding catheter   was removed.  An additional 100 mcg of intra-arterial nitroglycerin was given   through the sheath and the sheath was removed without incident.  There were no   complications.    ESTIMATED BLOOD LOSS:  15 mL.    FLUOROSCOPY TIME:  14.8.    DOSE-AREA PRODUCT FOR X-RAY:  11,934.    TOTAL CONTRAST MEDIA:  220 mL of Omnipaque 350.    COMPLICATIONS:  There were no complications.    HEMODYNAMICS:  Revealed sinus rhythm throughout the procedure.  Aortic   pressure 104/78 mmHg.  Left ventricular pressure was 142/15 mmHg.  LV pullback   does not demonstrate a gradient.    Fluoroscopy of the heart demonstrates mild coronary artery calcification in   the left coronary artery.    The left main is free of  "disease.  This vessel trifurcates into ramus   intermedius, the LAD, and the circumflex.  The LAD supplies the apex and also   wraps around to supply the inferoapical segment of the ventricle.  Proximally,   the LAD gives rise to a large caliber diagonal artery that bifurcates   distally.  This diagonal artery has an ostial stenosis that is subtotal   (greater than 95%) at its origin.  There was just a \"thread\" of dye noted and   TAHIR 2 flow in the diagonal.  At the level of the origin of this diagonal   artery, the LAD itself has approximately 25% stenosis.    Post-interventional images demonstrate that there is now TAHIR 3 flow in the   diagonal artery.  There is a fully deployed stent in the proximal diagonal   with no residual stenosis.  The stenosis at the origin of the LAD itself is   unchanged.  There is TAHIR 3 flow to the terminus of the LAD without any   filling defects noted.    The circumflex is a small caliber vessel consisting of a single marginal   artery and is unremarkable.    The right coronary artery is a dominant and large caliber system.  The right   coronary artery gives rise to paroxysmal atrial branch.  An RV free wall   branch arises in midway between the origin and the acute margin.  There is   another small atrial branch, a small RV branch near the acute margin.    Distally, the right coronary artery gives rise to a small caliber PDA and a   large posterior left ventricular artery.    The left ventriculogram demonstrates hypokinesis of the distal anterior wall.    The overall ejection fraction is preserved and calculated at 70%.    IMPRESSION:  1.  Successful stenting of ostium of the LAD diagonal with a 2.75x12 mm Xience   Alpine stent.  2.  Residual 25% to 30% lesion in the LAD at the level of the first diagonal   artery.  3.  No other significant coronary artery disease.  4.  Mild hypokinesis of the distal anterior wall.       ____________________________________     DEANNA ANDREW, " MD [facility] / [facility]    DD:  08/28/2017 15:45:49  DT:  08/28/2017 16:41:16    D#:  [id number]  Job#:  [id number]

## 2017-08-28 NOTE — H&P
Hospital Medicine History and Physical    Date of Service  8/27/2017    Chief Complaint  No chief complaint on file.      History of Presenting Illness  71 y.o. female who presented 8/27/2017 with pmh of asthma is coming today transferred from OSH due to abnormal stress test, patient stated that she is been having chest pain pressure like for 2 days, no radiated, increased with exertion,  Better with rest, patient went to OSH where she had initial workup that was negative, for that she went for nuclear stress test that showed moderate anterior defect with moderate amount of adonay infarct ischemia, cardiologist was consulted and planning to get cardiac cath in am.   Patient at this time she is pain free, no N/V/D/C, not in distress. No dizziness, no lightheadedness. Denies fever or chills, no focal weakness, no numbness or tingling.     Primary Care Physician  No primary care provider on file.    Consultants  cardiologist    Code Status  Full code.     Review of Systems  Review of Systems   Constitutional: Negative for chills.   HENT: Negative for hearing loss.    Eyes: Negative for double vision.   Respiratory: Negative for cough.    Cardiovascular: Negative for orthopnea.   Gastrointestinal: Negative for nausea.   Genitourinary: Negative for dysuria.   Musculoskeletal: Negative for myalgias.   Skin: Negative for itching.   Neurological: Negative for tremors.   Endo/Heme/Allergies: Does not bruise/bleed easily.   Psychiatric/Behavioral: Negative for depression.          Past Medical History  Past Medical History:   Diagnosis Date   • Asthma        Surgical History  Past Surgical History:   Procedure Laterality Date   • KNEE REPLACEMENT, TOTAL         Medications  No current facility-administered medications on file prior to encounter.      No current outpatient prescriptions on file prior to encounter.       Family History  No family history on file.    Social History  Social History   Substance Use Topics   •  Smoking status: Former Smoker   • Smokeless tobacco: Never Used   • Alcohol use 1.2 oz/week     2 Glasses of wine per week      Comment: 2 glasses a night       Allergies  Allergies not on file     Physical Exam  Laboratory   Hemodynamics  Temp (24hrs), Av.7 °C (98 °F), Min:36.7 °C (98 °F), Max:36.7 °C (98 °F)   Temperature: 36.7 °C (98 °F)  Pulse  Av  Min: 107  Max: 107    Blood Pressure : 115/80      Respiratory      Respiration: 16, Pulse Oximetry: 95 %     Work Of Breathing / Effort: Accessory Muscle Use;Moderate;Increased Work of Breathing (hx asthma)  RUL Breath Sounds: Clear, RML Breath Sounds: Diminished, RLL Breath Sounds: Diminished, OCTAVIO Breath Sounds: Clear, LLL Breath Sounds: Diminished    Physical Exam   Constitutional: She is oriented to person, place, and time. She appears well-developed.   HENT:   Head: Normocephalic.   Mouth/Throat: No oropharyngeal exudate.   Eyes: Pupils are equal, round, and reactive to light. Right eye exhibits no discharge. Left eye exhibits no discharge. No scleral icterus.   Neck: Normal range of motion. No JVD present.   Cardiovascular: Normal rate, regular rhythm and normal heart sounds.    Pulmonary/Chest: Effort normal and breath sounds normal. No respiratory distress. She has no wheezes.   Abdominal: Soft. Bowel sounds are normal. She exhibits no distension. There is no tenderness.   Musculoskeletal: Normal range of motion. She exhibits no edema.   Lymphadenopathy:     She has no cervical adenopathy.   Neurological: She is alert and oriented to person, place, and time. No cranial nerve deficit.   Skin: No rash noted.   Psychiatric: She has a normal mood and affect.   Nursing note and vitals reviewed.              Labs from OSH reviewed showed troponin 0.06 x 2,   Cbc, bmp unremarkable.                 Imaging  Stress test report reviewed.  ekg reviewed.    Assessment/Plan     I anticipate this patient will require at least two midnights for appropriate medical  management, necessitating inpatient admission.    Asthma- (present on admission)   Assessment & Plan    Well control, prn inhalers         NSTEMI (non-ST elevated myocardial infarction) (CMS-[facility])- (present on admission)   Assessment & Plan    Has mildly elevated troponin but moderate defect on NM stress test ,cardiologist consulted, going for cath in am. She is pain free at this time.             VTE prophylaxis: scd's.

## 2017-08-28 NOTE — ASSESSMENT & PLAN NOTE
Cardiac cath today: showed subtotal (98%) osteal ALEXIS with TAHIR II flow post Stent with good result. Mild hypokinsis of distal anterior wall.  On asa, effient, statin, bbocker  Card: following

## 2017-08-28 NOTE — PROGRESS NOTES
Cardiology Progress Note               Author: Marcus Sawyer Date & Time created: 2017  7:47 AM     Interval History    ROS    Physical Exam    Hemodynamics:  Temp (24hrs), Av.7 °C (98 °F), Min:36.3 °C (97.3 °F), Max:37 °C (98.6 °F)  Temperature: 36.3 °C (97.3 °F)  Pulse  Av.5  Min: 90  Max: 107   Blood Pressure : 141/87     Respiratory:    Respiration: 16, Pulse Oximetry: 93 %, O2 Daily Delivery Respiratory : Room Air with O2 Available     #MDI/DPI Given: MDI/DPI x 1, Work Of Breathing / Effort: Mild  RUL Breath Sounds: Clear, RML Breath Sounds: Clear, RLL Breath Sounds: Diminished, OCTAVIO Breath Sounds: Clear, LLL Breath Sounds: Diminished  Fluids:     Weight: 74.6 kg (164 lb 7.4 oz)  GI/Nutrition:  Orders Placed This Encounter   Procedures   • Diet NPO     Standing Status:   Standing     Number of Occurrences:   1     Order Specific Question:   Restrict to:     Answer:   Strict [1]     Lab Results:  Recent Labs      17   0121   WBC  3.7*   RBC  4.59   HEMOGLOBIN  13.9   HEMATOCRIT  41.5   MCV  90.4   MCH  30.3   MCHC  33.5*   RDW  43.0   PLATELETCT  256   MPV  9.0     Recent Labs      17   0121   SODIUM  138   POTASSIUM  3.7   CHLORIDE  106   CO2  23   GLUCOSE  93   BUN  18   CREATININE  0.53   CALCIUM  9.6     Recent Labs      17   0121   INR  0.97         Recent Labs      17   0121   TROPONINI  0.21*       Procedure and inherent risks of angiography and coronary intervention discussed with patient this morning, including risk of conscious sedation, stroke, heart attack, kidney damage and death      Medical Decision Making, by Problem:  Active Hospital Problems    Diagnosis   • NSTEMI (non-ST elevated myocardial infarction) (CMS-HCC) [I21.4]   • Asthma [J45.909]       Plan:    Quality-Core Measures

## 2017-08-28 NOTE — PROGRESS NOTES
2 RN skin check. Pt skin intact without open areas. No redness noted to ears, elbows sacrum or heels

## 2017-08-28 NOTE — CARE PLAN
Problem: Communication  Goal: The ability to communicate needs accurately and effectively will improve  Outcome: PROGRESSING AS EXPECTED  Ongoing communication concerning tests, labs, procedures and disease process.       Problem: Safety  Goal: Will remain free from injury  Outcome: PROGRESSING AS EXPECTED  Hourly rounding, bed low and locked. Call light within reach. Alarms on for safety.

## 2017-08-28 NOTE — CARE PLAN
Problem: Communication  Goal: The ability to communicate needs accurately and effectively will improve  Outcome: PROGRESSING AS EXPECTED  Pt and family educated on POC and all questions addressed.     Problem: Knowledge Deficit  Goal: Knowledge of disease process/condition, treatment plan, diagnostic tests, and medications will improve  Outcome: PROGRESSING AS EXPECTED  Pt and family anxious about cath and stent placement. Reeducated about POC, anxiety relieved. Will monitor.

## 2017-08-28 NOTE — PROGRESS NOTES
Pt arrived from [facility] via EMS. Significant other at bedside. Pt Ax4, steady on feet, denies chest pain. Report received from DAY Cabezas at [facility]. Admitting called for paperwork.

## 2017-08-29 ENCOUNTER — PATIENT OUTREACH (OUTPATIENT)
Dept: HEALTH INFORMATION MANAGEMENT | Facility: OTHER | Age: 72
End: 2017-08-29

## 2017-08-29 VITALS
DIASTOLIC BLOOD PRESSURE: 63 MMHG | RESPIRATION RATE: 18 BRPM | OXYGEN SATURATION: 94 % | HEIGHT: 69 IN | WEIGHT: 164.9 LBS | HEART RATE: 78 BPM | BODY MASS INDEX: 24.42 KG/M2 | TEMPERATURE: 99 F | SYSTOLIC BLOOD PRESSURE: 93 MMHG

## 2017-08-29 LAB
CHOLEST SERPL-MCNC: 203 MG/DL (ref 100–199)
HDLC SERPL-MCNC: 69 MG/DL
LDLC SERPL CALC-MCNC: 119 MG/DL
TRIGL SERPL-MCNC: 75 MG/DL (ref 0–149)

## 2017-08-29 PROCEDURE — 97161 PT EVAL LOW COMPLEX 20 MIN: CPT

## 2017-08-29 PROCEDURE — A9270 NON-COVERED ITEM OR SERVICE: HCPCS | Performed by: HOSPITALIST

## 2017-08-29 PROCEDURE — 97535 SELF CARE MNGMENT TRAINING: CPT

## 2017-08-29 PROCEDURE — 700102 HCHG RX REV CODE 250 W/ 637 OVERRIDE(OP): Performed by: INTERNAL MEDICINE

## 2017-08-29 PROCEDURE — 700102 HCHG RX REV CODE 250 W/ 637 OVERRIDE(OP): Performed by: HOSPITALIST

## 2017-08-29 PROCEDURE — A9270 NON-COVERED ITEM OR SERVICE: HCPCS | Performed by: INTERNAL MEDICINE

## 2017-08-29 PROCEDURE — 36415 COLL VENOUS BLD VENIPUNCTURE: CPT

## 2017-08-29 PROCEDURE — 80061 LIPID PANEL: CPT

## 2017-08-29 PROCEDURE — 99239 HOSP IP/OBS DSCHRG MGMT >30: CPT | Performed by: HOSPITALIST

## 2017-08-29 RX ORDER — ROSUVASTATIN CALCIUM 20 MG/1
20 TABLET, COATED ORAL EVERY EVENING
Qty: 30 TAB | Refills: 11 | Status: SHIPPED | OUTPATIENT
Start: 2017-08-29

## 2017-08-29 RX ORDER — PRASUGREL 10 MG/1
10 TABLET, FILM COATED ORAL DAILY
Qty: 30 TAB | Refills: 5 | Status: SHIPPED | OUTPATIENT
Start: 2017-08-29

## 2017-08-29 RX ORDER — ASPIRIN 81 MG/1
81 TABLET ORAL DAILY
Qty: 30 TAB | Refills: 3 | Status: SHIPPED | OUTPATIENT
Start: 2017-08-29

## 2017-08-29 RX ADMIN — ESCITALOPRAM OXALATE 10 MG: 10 TABLET ORAL at 08:55

## 2017-08-29 RX ADMIN — LEVOTHYROXINE, LIOTHYRONINE 30 MG: 19; 4.5 TABLET ORAL at 08:54

## 2017-08-29 RX ADMIN — ASPIRIN 81 MG: 81 TABLET ORAL at 08:54

## 2017-08-29 RX ADMIN — METOPROLOL TARTRATE 25 MG: 25 TABLET, FILM COATED ORAL at 08:55

## 2017-08-29 RX ADMIN — PRASUGREL HYDROCHLORIDE 10 MG: 10 TABLET, FILM COATED ORAL at 08:54

## 2017-08-29 ASSESSMENT — LIFESTYLE VARIABLES
TOTAL SCORE: 0
DO YOU DRINK ALCOHOL: YES
HAVE YOU EVER FELT YOU SHOULD CUT DOWN ON YOUR DRINKING: NO
ON A TYPICAL DAY WHEN YOU DRINK ALCOHOL HOW MANY DRINKS DO YOU HAVE: 2
EVER HAD A DRINK FIRST THING IN THE MORNING TO STEADY YOUR NERVES TO GET RID OF A HANGOVER: NO
CONSUMPTION TOTAL: POSITIVE
HOW MANY TIMES IN THE PAST YEAR HAVE YOU HAD 5 OR MORE DRINKS IN A DAY: 0
EVER FELT BAD OR GUILTY ABOUT YOUR DRINKING: NO
TOTAL SCORE: 0
AVERAGE NUMBER OF DAYS PER WEEK YOU HAVE A DRINK CONTAINING ALCOHOL: 5
HAVE PEOPLE ANNOYED YOU BY CRITICIZING YOUR DRINKING: NO
TOTAL SCORE: 0

## 2017-08-29 ASSESSMENT — PAIN SCALES - GENERAL
PAINLEVEL_OUTOF10: 0

## 2017-08-29 ASSESSMENT — COGNITIVE AND FUNCTIONAL STATUS - GENERAL
MOBILITY SCORE: 24
SUGGESTED CMS G CODE MODIFIER MOBILITY: CH

## 2017-08-29 ASSESSMENT — ENCOUNTER SYMPTOMS
PALPITATIONS: 0
SHORTNESS OF BREATH: 0

## 2017-08-29 NOTE — DISCHARGE SUMMARY
CHIEF COMPLAINT ON ADMISSION  Transferred from outside hospital for chest pain with abnormal stress test    CODE STATUS  Full Code    HPI & HOSPITAL COURSE  This is a 71 y.o. female here with after transfer from outlying facility for chest pain with abnormal stress test and NSTEMI.  Cardiology was consulted and the patient underwent cardiac catheterization which revealed a lesion in the LAD at the level of the 1st diagonal artery. Patient had successful stenting of the ostium and was medically optimized by our cardiology consultants. She was monitored for an additional day after her procedure for evidence of cardiac reperfusion dysrhythmias and none were noted. She remains asymptomatic and medically stable for discharge home.    Therefore, she is discharged in good and stable condition with close outpatient follow-up.    SPECIFIC OUTPATIENT FOLLOW-UP  Patient has been scheduled to follow-up with cardiology in [facility] on August 31 at 1 PM    DISCHARGE PROBLEM LIST  Active Problems:    NSTEMI (non-ST elevated myocardial infarction) (CMS-[facility]) POA: Yes    Asthma POA: Yes  Resolved Problems:    * No resolved hospital problems. *      FOLLOW UP  No future appointments.  Ernie Carter M.D.  [address] [id number] [phone]    Go on 8/31/2017  Please arrive at 1:00 pm for your appointment. Thank you.    Slick Burgos M.D.  [address] [facility] [address]  [place] [address]  [phone]    Schedule an appointment as soon as possible for a visit in 1 week  For a follow up appointment. Thank you.      MEDICATIONS ON DISCHARGE   Nneka Bagley   Home Medication Instructions GABI:[id number]    Printed on:08/29/17 1523   Medication Information                      aspirin EC 81 MG EC tablet  Take 1 Tab by mouth every day.             azelastine (ASTELIN) 137 MCG/SPRAY nasal spray  [place] 1 Spray in nose 2 times a day.             diclofenac EC (VOLTAREN) 75 MG Tablet Delayed Response  Take 75 mg  by mouth 2 times a day.             escitalopram (LEXAPRO) 10 MG Tab  Take 10 mg by mouth every day.             fluticasone (FLOVENT HFA) 110 MCG/ACT Aerosol  Inhale 2 Puffs by mouth 2 times a day.             metoprolol (LOPRESSOR) 25 MG Tab  Take 1 Tab by mouth 2 Times a Day.             prasugrel (EFFIENT) 10 MG Tab  Take 1 Tab by mouth every day.             rosuvastatin (CRESTOR) 20 MG Tab  Take 1 Tab by mouth every evening.             thyroid (ARMOUR THYROID) 30 MG Tab  Take 30 mg by mouth every day.                 DIET  Orders Placed This Encounter   Procedures   • Diet Order     Standing Status:   Standing     Number of Occurrences:   1     Order Specific Question:   Diet:     Answer:   Cardiac [6]       ACTIVITY  As tolerated.  Weight bearing as tolerated      CONSULTATIONS  Cardiology    PROCEDURES  Cardiac catheterization    LABORATORY  Lab Results   Component Value Date/Time    SODIUM 138 08/28/2017 01:21 AM    POTASSIUM 3.7 08/28/2017 01:21 AM    CHLORIDE 106 08/28/2017 01:21 AM    CO2 23 08/28/2017 01:21 AM    GLUCOSE 93 08/28/2017 01:21 AM    BUN 18 08/28/2017 01:21 AM    CREATININE 0.53 08/28/2017 01:21 AM        Lab Results   Component Value Date/Time    WBC 3.7 (L) 08/28/2017 01:21 AM    HEMOGLOBIN 13.9 08/28/2017 01:21 AM    HEMATOCRIT 41.5 08/28/2017 01:21 AM    PLATELETCT 256 08/28/2017 01:21 AM        Total time of the discharge process exceeds 36 minutes

## 2017-08-29 NOTE — DISCHARGE PLANNING
"SW spoke with [facility] pharmacy. Px should be available for  later today and co-pay is \"around $38\".  "

## 2017-08-29 NOTE — CARE PLAN
Problem: Safety  Goal: Will remain free from falls  Outcome: PROGRESSING AS EXPECTED  Pt educated on the importance of using call light before getting out of bed. Pt call appropriately and waits for hospital staff to assist with ambulation.      Problem: Infection  Goal: Will remain free from infection  Outcome: PROGRESSING AS EXPECTED  Pt educated on the importance of hand washing to reduce the risk of infection. Pt verbally understands and performs hand hygiene to reduce to risks of infection.

## 2017-08-29 NOTE — PROGRESS NOTES
Cardiovascular Nurse Navigator ([id number]) Note:    Reviewed ACS/PCI medications:  Dual Antiplatelet Therapy (DAPT):  aspirin + prasugrel  Beta-Blocker:  metoprolol  Statin:  rosuvastatin  ACEI/ARB:  N/a--EF 55%  Consider for aldosterone blockade?   N/a--EF 55%     Intensive Cardiac Rehab (ICR) Referral:  Referred on 8/28; has current inpatient orders for nutrition consult & PT for Phase I ICR    Cardiology Follow-Up:  From [facility], patient instructed to establish with cardiologist and f/u within 7-10 days    Inpatient & Discharge Patient Education:  Bedside nursing to continually provide patient education on ACS meds, signs and symptoms to monitor for, and risk factor modification. Also at discharge please complete the “ACS” special instructions on the AVS.  Thank you and please call with questions.

## 2017-08-29 NOTE — DISCHARGE PLANNING
SW received Effient px script. SW met with pt and pt's spouse at bedside. Per pt, pt's pharmacy is [facility] in [place], [place] ([address] - [phone]), but pt also requested SW to send px to [facility].    SW [facility] [place] [facility].    Plan:    [facility] [place] [facility] know [facility]x [facility]/co-pay

## 2017-08-29 NOTE — DISCHARGE PLANNING
SW spoke with [facility] pharmacist. Per Pharmacist, px currently not in stock, but co-pay will be $38 and co-pay should be the same at any [facility].     Pt notified of co-pay. SW stated to pt that she is having diffculty getting a hold of [facility]. Pt was able to get a hold of the [facility] pharmacist and they told her they have px in-stock, but pt hasn't ordered.    SW stated she will call [facility] again to order px. SW got a hold of [facility] pharmacy, but was told that they are very busy and will have to call SW back, but they received SW's fax and px is in-stock.     Plan:    Awaiting call from [facility]

## 2017-08-29 NOTE — PROGRESS NOTES
Pt discharged home, pt refused hospital escort and walked out with her . Pt verbally acknowledges all discharge instructions, medications, and medication regimen. Tele monitor and IV have been removed, pt has all personal belongings. All needs met at this time.

## 2017-08-29 NOTE — PROGRESS NOTES
Cardiology Progress Note               Author: Tino Davis Date & Time created: 2017  9:16 AM     Interval History:  : /79. Pulse 100. Tolerated PCI yesterday. Currently no cardiac symptoms.    Review of Systems   Respiratory: Negative for shortness of breath.    Cardiovascular: Negative for chest pain and palpitations.       Physical Exam   Constitutional: She is oriented to person, place, and time. She appears well-nourished. No distress.   HENT:   Head: Normocephalic.   Eyes: EOM are normal. No scleral icterus.   Neck:   Normal jugular venous pressure.   Cardiovascular: Normal rate, regular rhythm, S1 normal, S2 normal and normal heart sounds.  Exam reveals no gallop and no friction rub.    No murmur heard.  Pulses:       Radial pulses are 2+ on the right side.   Pulmonary/Chest: Effort normal and breath sounds normal. She has no wheezes. She has no rhonchi. She has no rales.   Musculoskeletal: She exhibits no edema.   1-2+ right radial pulse.  No hematoma.  Mild puffiness.  No tenderness   Neurological: She is alert and oriented to person, place, and time.   Skin: Skin is warm and dry.   Psychiatric: She has a normal mood and affect. Her behavior is normal.       Hemodynamics:  Temp (24hrs), Av.6 °C (97.8 °F), Min:36 °C (96.8 °F), Max:37.2 °C (99 °F)  Temperature: 36.6 °C (97.8 °F)  Pulse  Av.4  Min: 66  Max: 107   Blood Pressure : 107/79     Respiratory:    Respiration: 19, Pulse Oximetry: 91 %     Work Of Breathing / Effort: Mild  RUL Breath Sounds: Clear, RML Breath Sounds: Clear, RLL Breath Sounds: Clear, OCTAVIO Breath Sounds: Clear, LLL Breath Sounds: Clear  Fluids:     Weight: 74.8 kg (164 lb 14.5 oz)  GI/Nutrition:  Orders Placed This Encounter   Procedures   • Diet Order     Standing Status:   Standing     Number of Occurrences:   1     Order Specific Question:   Diet:     Answer:   Cardiac [6]     Lab Results:  Recent Labs      17   0121   WBC  3.7*   RBC  4.59    HEMOGLOBIN  13.9   HEMATOCRIT  41.5   MCV  90.4   MCH  30.3   MCHC  33.5*   RDW  43.0   PLATELETCT  256   MPV  9.0     Recent Labs      08/28/17   0121   SODIUM  138   POTASSIUM  3.7   CHLORIDE  106   CO2  23   GLUCOSE  93   BUN  18   CREATININE  0.53   CALCIUM  9.6     Recent Labs      08/28/17   0121   INR  0.97         Recent Labs      08/28/17   0121   TROPONINI  0.21*     Recent Labs      08/29/17   0318   TRIGLYCERIDE  75   HDL  69   LDL  119*         Medical Decision Making, by Problem:  Active Hospital Problems    Diagnosis   • NSTEMI (non-ST elevated myocardial infarction) (CMS-[facility]) [I21.4]   • Asthma [J45.909]       Assessment and Plan:  NSTEMI.    Diagonal stent. Drug-eluting. 8/28/2017.    Hyperlipidemia.    I reviewed with the patient and her  her current cardiac condition.  I provided them names of cardiologist Dr. Lombard and Dr. John in [place], [place].  She will be contacting a Dr. Cordova who saw her in the emergency room for follow-up.  Medical records and a copy of her angiogram will be provided.  The patient was given activity limitations instructions.  Optimally she was told she should enroll in a cardiac rehab program once she returns home in the [place], [place] area.    Reviewed items::  EKG reviewed, Radiology images reviewed, Labs reviewed and Medications reviewed